# Patient Record
Sex: MALE
[De-identification: names, ages, dates, MRNs, and addresses within clinical notes are randomized per-mention and may not be internally consistent; named-entity substitution may affect disease eponyms.]

---

## 2019-11-07 ENCOUNTER — APPOINTMENT (OUTPATIENT)
Dept: OTOLARYNGOLOGY | Facility: CLINIC | Age: 59
End: 2019-11-07
Payer: COMMERCIAL

## 2019-11-07 VITALS
BODY MASS INDEX: 29.73 KG/M2 | HEART RATE: 65 BPM | WEIGHT: 185 LBS | HEIGHT: 66 IN | SYSTOLIC BLOOD PRESSURE: 139 MMHG | DIASTOLIC BLOOD PRESSURE: 71 MMHG

## 2019-11-07 DIAGNOSIS — Z86.79 PERSONAL HISTORY OF OTHER DISEASES OF THE CIRCULATORY SYSTEM: ICD-10-CM

## 2019-11-07 DIAGNOSIS — Z78.9 OTHER SPECIFIED HEALTH STATUS: ICD-10-CM

## 2019-11-07 DIAGNOSIS — J30.2 OTHER SEASONAL ALLERGIC RHINITIS: ICD-10-CM

## 2019-11-07 DIAGNOSIS — Z80.9 FAMILY HISTORY OF MALIGNANT NEOPLASM, UNSPECIFIED: ICD-10-CM

## 2019-11-07 DIAGNOSIS — Z86.39 PERSONAL HISTORY OF OTHER ENDOCRINE, NUTRITIONAL AND METABOLIC DISEASE: ICD-10-CM

## 2019-11-07 DIAGNOSIS — J31.0 CHRONIC RHINITIS: ICD-10-CM

## 2019-11-07 DIAGNOSIS — Z87.891 PERSONAL HISTORY OF NICOTINE DEPENDENCE: ICD-10-CM

## 2019-11-07 PROBLEM — Z00.00 ENCOUNTER FOR PREVENTIVE HEALTH EXAMINATION: Status: ACTIVE | Noted: 2019-11-07

## 2019-11-07 PROCEDURE — 99213 OFFICE O/P EST LOW 20 MIN: CPT | Mod: 25

## 2019-11-07 PROCEDURE — 92557 COMPREHENSIVE HEARING TEST: CPT

## 2019-11-07 PROCEDURE — 92567 TYMPANOMETRY: CPT

## 2019-11-07 PROCEDURE — G0268 REMOVAL OF IMPACTED WAX MD: CPT

## 2019-11-07 RX ORDER — LEVOTHYROXINE SODIUM 112 UG/1
112 TABLET ORAL
Refills: 0 | Status: ACTIVE | COMMUNITY

## 2019-11-07 RX ORDER — LIOTHYRONINE SODIUM 5 UG/1
5 TABLET ORAL
Refills: 0 | Status: ACTIVE | COMMUNITY

## 2019-11-07 RX ORDER — LISINOPRIL 10 MG/1
10 TABLET ORAL
Refills: 0 | Status: ACTIVE | COMMUNITY

## 2019-11-07 NOTE — ASSESSMENT
[FreeTextEntry1] : Cerumen impaction, now resolved.  The patient was counseled in aural hygiene and Qtip avoidance. I reassured him that his audiogram was normal\par

## 2019-11-07 NOTE — HISTORY OF PRESENT ILLNESS
[de-identified] : Patient seen last year for recurrent cerumen impaction. Previous audiogram July 2017 was basically normal. His ears feel clogged again today.  Denies otalgia, otorrhea, tinnitus, or vertigo.  Patient has no previous otologic history or acoustic trauma.\par \par

## 2021-07-14 ENCOUNTER — APPOINTMENT (OUTPATIENT)
Dept: OTOLARYNGOLOGY | Facility: CLINIC | Age: 61
End: 2021-07-14
Payer: COMMERCIAL

## 2021-07-14 VITALS — WEIGHT: 178 LBS | BODY MASS INDEX: 28.61 KG/M2 | TEMPERATURE: 97.1 F | HEIGHT: 66 IN

## 2021-07-14 DIAGNOSIS — H61.23 IMPACTED CERUMEN, BILATERAL: ICD-10-CM

## 2021-07-14 DIAGNOSIS — H93.299 OTHER ABNORMAL AUDITORY PERCEPTIONS, UNSPECIFIED EAR: ICD-10-CM

## 2021-07-14 PROCEDURE — 99072 ADDL SUPL MATRL&STAF TM PHE: CPT

## 2021-07-14 PROCEDURE — 69210 REMOVE IMPACTED EAR WAX UNI: CPT

## 2021-07-14 PROCEDURE — 99212 OFFICE O/P EST SF 10 MIN: CPT | Mod: 25

## 2021-07-14 RX ORDER — LEVOTHYROXINE SODIUM 0.17 MG/1
TABLET ORAL
Refills: 0 | Status: ACTIVE | COMMUNITY

## 2021-07-14 NOTE — HISTORY OF PRESENT ILLNESS
[de-identified] : Patient seen Nov 2019 for recurrent cerumen impaction. Previous audiogram July 2017, Nov 2019 basically normal. His ears feel clogged again today.  Denies otalgia, otorrhea, tinnitus, or vertigo.  Patient has no previous otologic history or acoustic trauma.\par \par

## 2021-07-14 NOTE — ASSESSMENT
[FreeTextEntry1] : Cerumen impaction, now resolved.  The patient was counseled in aural hygiene and Qtip avoidance. Audio at next visit